# Patient Record
(demographics unavailable — no encounter records)

---

## 2025-03-07 NOTE — CARDIOLOGY SUMMARY
[de-identified] : 3/7/2025: Normal sinus rhythm with a rate of 60 bpm, right axis, a normal OK interval 156 ms, a widened QRS duration 138 ms, consistent with RBBB, a normal QT interval 434 ms, with diffuse nonspecific ST or T wave changes associated with RBBB.   [de-identified] : Rest stress Cardiac PET scan on 8/5/2024. No ischemic ECG changes were seen. PET imaging showed evidence of a prior small nontransmural MI involving the mid inferior, basal inferior and and inferoapical segments. This is consistent with previous studies. [de-identified] : 7/29/2024: Normal LV size, upper normal wall thickness, and normal systolic function with an EF of 60%. The RV is normal in size with normal systolic function. Severe AS with an JAIRO of 0.88 cm, peak/mean pressure gradient of 79/56 mmHg and mild AI, no MS, mild MR, and trace TR was seen.  [de-identified] : Carotid ultrasound on 8/7/2024 showed bilateral intimal thickening with mild calcified plaque at the bulbs.

## 2025-03-07 NOTE — END OF VISIT
Lab and Dr Usha Dominguez at bedside.      Kaleb Lui RN  07/03/20 2018 [FreeTextEntry4] :   I, Wale Wharton, am scribing for and in the presence of Dr. Branden Villalba in the following sections HISTORY OF PRESENT ILLNESSS, PAST MEDICAL/FAMILY/SOCIAL HISTORY; REVIEW OF SYSTEMS; VITAL SIGNS; PHYSICAL EXAM; ASSESSMENT/PLAN [FreeTextEntry3] : I, Branden Villalba, personally performed the services described in this documentation. All medical record entries made by the scribe were at my direction and in my presence. I have reviewed the chart and agree that the record reflects my personal performance and is accurate and complete.            I was present for the above evaluation and agree with the history, physical examination, assessment and plan as above.

## 2025-03-07 NOTE — SOCIAL HISTORY
[TextEntry] :  The patient is currently  and living with his wife.  He and his wife have 3 sons, all of whom are well.  The patient does not smoke, nor has he in the past.  He denies any alcohol or drug use.  He is a semiretired  for the [employer].

## 2025-03-07 NOTE — ACTIVE PROBLEMS
[TextEntry] : 1.  Known history of coronary artery disease. 2.  Status post coronary artery bypass grafting x4 on 11/10/15 3.  Right sided neck pain 4.  Hypertension with adequate control. 5.  Diabetes with intermittent control 6.  Recent fall with T7-T8 fracture 7.  Right popliteal DVT 8.  Abnormal stress test on  8/5/2024 9.  S/P cardiac cath showing severe three vessel disease, with a patent LIMA to the LAD, 90% ostium of the SVG to the Ramus, 20% ostial stenosis of the SVG to the OM1, and occlusion of the SVG to the RCA with sequential 75% mRCA and 90% dRCA stenosis on 2/11/2022. 10.  S/P PTCI, Rotoblade and CYNDY x2 to the RCA on 3/7/2022. 11.  Weight loss on Mounjaro.  12.  Severe AS, JAIRO 0.88 cm.

## 2025-03-07 NOTE — HISTORY OF PRESENT ILLNESS
[FreeTextEntry1] : As you know Mr. Aponte is a 78-year-old man with a past medical history of hypertension, dyslipidemia, diabetes mellitus, status post coronary artery bypass grafting x4 with a LIMA to the LAD, a saphenous vein graft to the ramus intermedius coronary artery, a saphenous vein graft to the OM1, and a saphenous vein graft to the RPDA on 11/10/15, obesity, S/P PTCI, Rotoblade and CYNDY x2 to the RCA on 3/7/2022, who presents today for follow up. The patient was seen by Dr. Coon as he was experiencing worsening shortness of breath. Dr. Coon had advised left and right heart cardiac catheterization to evaluate for revascularization and pre TAVR work up. He wanted to discuss this today. The patient is feeling at his baseline. He denies any cardiac complaints at this time. He specifically denies any chest pain, shortness of breath, syncope, orthopnea, or PND.  He has been taking his medications as prescribed. He is fully vaccinated for Covid-19.

## 2025-03-07 NOTE — SURGICAL HISTORY
[TextEntry] : 1.	ARTERIAL BYPASS SURGERY	 	  2.	BACK SURGERY	 	  3.	BARIATRIC SURGERY	 	2007  	lap band 4.	BRONCHOSCOPY	 	  5	CARDIAC CATHETERIZATION	 	02/11/2022 6.	CARPAL TUNNEL RELEASE	Left	  7.	CATARACT EXTRACTION	Bilateral	march 2015, may 2015  8.	COLONOSCOPY	 	2018 9.	CORONARY ARTERY BYPASS GRAFT	 	11/10/2015  	CABG X 4, Right Endosocpic Saphenous Veins [place] 10.	DENTAL SURGERY	 	   	implants 11.	ELBOW SURGERY	 	  12.	EXCISION BENIGN SKIN LESION TRUNK / ARM / LEG	 	   	L arm melanoma wide excision 13.	EYE SURGERY	Bilateral	2015  	cataract surgery with lens implantation 14.	IVC FILTER PLACEMENT BY RADIOLOGIST	 	01/2022 15.	KNEE SURGERY	 	  16.	NECK SURGERY	 	1975  	calcium deposit was removed 17.	PROSTATE BIOPSY	 	  18.	TONSILLECTOMY	 	   	as a child 19.	torn meniscus	Left	2000  	knee 20.	TRIGGER FINGER RELEASE	 	  21.	ulnar nerve release	Left	2017 22. 	ULNAR NERVE TRANSPOSITION	Left

## 2025-03-07 NOTE — PHYSICAL EXAM
[Well Developed] : well developed [Well Nourished] : well nourished [No Acute Distress] : no acute distress [Normal Conjunctiva] : normal conjunctiva [Normal Venous Pressure] : normal venous pressure [No Carotid Bruit] : no carotid bruit [Normal S1, S2] : normal S1, S2 [No Rub] : no rub [No Gallop] : no gallop [Clear Lung Fields] : clear lung fields [Good Air Entry] : good air entry [No Respiratory Distress] : no respiratory distress  [Soft] : abdomen soft [Non Tender] : non-tender [No Masses/organomegaly] : no masses/organomegaly [Normal Bowel Sounds] : normal bowel sounds [Normal Gait] : normal gait [No Edema] : no edema [No Cyanosis] : no cyanosis [No Clubbing] : no clubbing [No Varicosities] : no varicosities [No Rash] : no rash [No Skin Lesions] : no skin lesions [Moves all extremities] : moves all extremities [No Focal Deficits] : no focal deficits [Normal Speech] : normal speech [Alert and Oriented] : alert and oriented [Normal memory] : normal memory [de-identified] : III/VI KATIE

## 2025-03-07 NOTE — PAST MEDICAL HISTORY
[TextEntry] : 1.	Anemia	   	status post Hematology eval possibly due to dapsone treatment  / hx anemia followed by Heme 2.	Arthritis	   	psoriatic arthritis 3.	Blepharitis	  4.	BPH (benign prostatic hyperplasia)	   	with elevated PSA followed by  5.	CAD (coronary artery disease)	   	s/p CABG x4 in 2015 , RLE harvest wound infxn , Stress test 12/2019 - negative for ischemia  / s/p cardiac intervention at [facility] 3/4/22-   /  Myoview 6/3/22-small transmural MI no ischemia-to f/u regularly 6.	Cataracts, bilateral	  7.	Chronic deep vein thrombosis (DVT) of distal vein of right lower extremity (HCC)	  8.	Chronic rhinitis	  9.	Deep vein thrombosis (DVT) of lower extremity (HCC)	  10.	Dementia (HCC)	   	Mild OMS s/p Neuro eval and f/u MRI with advanced WM dx 11.	Deviated septum	  12.	Diabetes mellitus (HCC)	   	with neuropathy 13.	DVT (deep venous thrombosis) (HCC)	   	Hx post-op DVT with IVCF 1/22- 14.	Encounter for diagnostic colonoscopy due to change in bowel habits	07/18/2018 15.	Fracture	   	Fracture of body of vertebra 16.	High cholesterol	  17.	Hx of CABG	  18.	Hypertension	  19.	Hypothyroidism due to Hashimoto's thyroiditis	  20.	Low vitamin B12 level	  21.	Lower urinary tract symptoms due to benign prostatic hyperplasia	  22.	Mixed hyperlipidemia	  23.	Morbid obesity (HCC)	  24.	Neuropathy	  25.	Polyp of nasal cavity	  26.	Presence of IVC filter	  27.	Psoriasis	  28.	Psoriatic arthritis (HCC)	  29.	Rheumatoid arthritis (HCC)	  30.	Skin cancer	2011  	melanoma on arm and is removed 31.	Sleep apnea	  32.	Status post spinal arthrodesis	   	thoracic / s/p fusion [id number] Dr Delarosa-cholelithiasis seen on f/u CT 3/36-bevpvpdtvgdq-m/u q 6 mos 33.	Thyroid disease	   	hypothyroidism 34.	Vasomotor rhinitis

## 2025-03-07 NOTE — ASSESSMENT
[FreeTextEntry1] : 1.  Known history of coronary artery disease. 2.  Status post coronary artery bypass grafting x4 on 11/10/15 3.  Right sided neck pain 4.  Hypertension with adequate control. 5.  Diabetes with intermittent control 6.  Recent fall with T7-T8 fracture 7.  Right popliteal DVT 8.  Abnormal stress test on  8/5/2024 9.  S/P cardiac cath showing severe three vessel disease, with a patent LIMA to the LAD, 90% ostium of the SVG to the Ramus, 20% ostial stenosis of the SVG to the OM1, and occlusion of the SVG to the RCA with sequential 75% mRCA and 90% dRCA stenosis on 2/11/2022. 10.  S/P PTCI, Rotoblade and CYNDY x2 to the RCA on 3/7/2022. 11.  Weight loss on Mounjaro.  12.  Severe AS, JAIRO 0.88 cm.

## 2025-03-07 NOTE — DISCUSSION/SUMMARY
[EKG obtained to assist in diagnosis and management of assessed problem(s)] : EKG obtained to assist in diagnosis and management of assessed problem(s) [FreeTextEntry1] : The patient's heart rate and blood pressure are well controlled. I have asked him to continue with his current medications as prescribed without change.  The patient has severe aortic stenosis and has been referred for TAVR. He has a history of a prior open heart procedure and is considered a higher risk for a repeat open heart surgery compared to TAVR. Dr. Mohan will evaluate the patient and make the final decision regarding the treatment approach  I have asked the patient to follow a low salt, low fat, low cholesterol diet. I have asked the patient to engage in a minimum of 30 minutes of exercise daily.   I have asked that the patient follow up with me in 6 months' time, or sooner with any change in symptoms

## 2025-03-19 NOTE — HISTORY OF PRESENT ILLNESS
[FreeTextEntry1] : Mr. RAOUL SUNSHINE is a 79 year old male referred by Dr. Villalba for Aortic Stenosis.    He has a PMH pertinent for HTN, DM (Metformin, Mounjaro), CAD (CABG x 4- 2016 [facility]- LIMA to LAD, SVG to Ramus, SVG to OM1 and SVG to RPDA, CYNDY x 2 to RCA 3/22), HLD, Back surgery and Severe Aortic Stenosis.     He presents today for TAVR candidacy.  He states overall he feels "I really walk slow becuase of my back, that is sometimes a balance problem". He has bilateral lower extremity edema. Mr. RAOUL SUNSHINE  denies any chest pain, shortness of breath, fatigue, palpitations, lightheaded/dizziness, syncope, orthopnea, or cough.     NYHA Class  II.  He lives at home with his wife who has a 24 hour live in aid, she is on HD and can't walk so he wishes she were better. He ambulates independently, although admits that he should possibly be using a cane but he doesn't, he is limited in his mobility by his back.

## 2025-03-19 NOTE — PHYSICAL EXAM
[General Appearance - Alert] : alert [General Appearance - In No Acute Distress] : in no acute distress [General Appearance - Well Nourished] : well nourished [General Appearance - Well Developed] : well developed [Sclera] : the sclera and conjunctiva were normal [Outer Ear] : the ears and nose were normal in appearance [Neck Appearance] : the appearance of the neck was normal [Exaggerated Use Of Accessory Muscles For Inspiration] : no accessory muscle use [Respiration, Rhythm And Depth] : normal respiratory rhythm and effort [Auscultation Breath Sounds / Voice Sounds] : lungs were clear to auscultation bilaterally [IV] : a grade 4 [___ +] : bilateral [unfilled]U+ pitting edema to the ankles [Skin Color & Pigmentation] : normal skin color and pigmentation [Skin Turgor] : normal skin turgor [Skin Lesions] : no skin lesions [] : no rash [No Focal Deficits] : no focal deficits [Oriented To Time, Place, And Person] : oriented to person, place, and time [FreeTextEntry1] : intermittent rash in his groin

## 2025-03-19 NOTE — ASSESSMENT
[FreeTextEntry1] : I have independently reviewed the medical records and imaging at the time of this office consultation and discussed the following interpretations with Mr. SUNSHINE:   After review of the TTE Mr. SUNSHINE is noted to have severe aortic stenosis. The risks and benefits of both Surgical Aortic Valve Replacement versus Transcatheter Aortic Valve Replacement (TAVR) have been explained and he would like to proceed with further workup and consideration for TAVR by the Structural Heart Team.   Risks, benefits and alternatives to TAVR were discussed with the patient in detail. Risks discussed included, but not limited to, infection, bleeding, myocardial infarction, cerebrovascular accident, renal failure, vascular injury requiring intervention, cardiac rupture and death. In addition, a roughly 5-10% risk of significant heart block requiring permanent pacemaker implantation was highlighted.   During this visit a shared decision-making process was utilized and included Mr. SUNSHINE, his family, and the available options.  Surgical options, transcatheter options and alternatives to surgery were discussed. Mr. SUNSHINE's values and preferences were considered. He fully understands and wishes to proceed. All questions were answered to his understanding and satisfaction.   Prior to TAVR, Mr. SUNSHINE will require a dedicated TAVR CT scan. The purpose of this CT scan is to evaluate the size of the aortic valve and annulus, and measure peripheral vessel diameters to determine feasibility for transcatheter access for the TAVR, and measure of alternate access options if transfemoral is not feasible.  Once the TAVR CT scan is complete the scan will be reviewed by a multidisciplinary team of interventional cardiologist, cardiac surgeons and PAs and NPs, to plan the best approach for the surgical procedure.   PREOPERATIVE CHECKLIST: (Discussed with patient)  - Confirm allergies, including latex: PCN - Confirm pacemaker: None  - Anticoagulation/antiplatelets noted and will be discontinued/continued: Aspirin & Brillinta- maintain - SGLT-2 Inhibitors (discontinued 3 days prior to surgery) or GLP-1 (discontinued 1 week prior to surgery): Mounjaro- 1 week  - All other supplements, NSAIDs and fish oil were discussed and will be held one week before surgery   PLAN: - TAVR CTA - Cardiac Catheterization, to assess the Coronary arteries - Transthoracic Echocardiogram at [facility] to assess the structure and function of the heart valves  - Plan for TAVR tentatively on 5/16/25 - Obtain Op note & Prior Cath notes from [facility]   , Dr. Mohan, personally performed the evaluation and management (E/M) services for this new patient.  That E/M includes conducting the initial examination, assessing all conditions, and establishing the plan of care.  Today, Ella Aaron, was here to observe my evaluation and management services for this patient to be followed going forward.

## 2025-03-19 NOTE — PHYSICAL EXAM
[General Appearance - Alert] : alert [General Appearance - In No Acute Distress] : in no acute distress [General Appearance - Well Nourished] : well nourished [General Appearance - Well Developed] : well developed [Sclera] : the sclera and conjunctiva were normal [Outer Ear] : the ears and nose were normal in appearance [Neck Appearance] : the appearance of the neck was normal [Exaggerated Use Of Accessory Muscles For Inspiration] : no accessory muscle use [Respiration, Rhythm And Depth] : normal respiratory rhythm and effort [Auscultation Breath Sounds / Voice Sounds] : lungs were clear to auscultation bilaterally [IV] : a grade 4 [___ +] : bilateral [unfilled]U+ pitting edema to the ankles [Skin Color & Pigmentation] : normal skin color and pigmentation [Skin Turgor] : normal skin turgor [] : no rash [Skin Lesions] : no skin lesions [No Focal Deficits] : no focal deficits [Oriented To Time, Place, And Person] : oriented to person, place, and time [FreeTextEntry1] : intermittent rash in his groin

## 2025-03-19 NOTE — ASSESSMENT
[FreeTextEntry1] : I have independently reviewed the medical records and imaging at the time of this office consultation and discussed the following interpretations with Mr. SUNSHINE:   After review of the TTE Mr. SUNSHINE is noted to have severe aortic stenosis. The risks and benefits of both Surgical Aortic Valve Replacement versus Transcatheter Aortic Valve Replacement (TAVR) have been explained and he would like to proceed with further workup and consideration for TAVR by the Structural Heart Team.   Risks, benefits and alternatives to TAVR were discussed with the patient in detail. Risks discussed included, but not limited to, infection, bleeding, myocardial infarction, cerebrovascular accident, renal failure, vascular injury requiring intervention, cardiac rupture and death. In addition, a roughly 5-10% risk of significant heart block requiring permanent pacemaker implantation was highlighted.   During this visit a shared decision-making process was utilized and included Mr. SUNSHINE, his family, and the available options.  Surgical options, transcatheter options and alternatives to surgery were discussed. Mr. SUNSHINE's values and preferences were considered. He fully understands and wishes to proceed. All questions were answered to his understanding and satisfaction.   Prior to TAVR, Mr. SUNSHINE will require a dedicated TAVR CT scan. The purpose of this CT scan is to evaluate the size of the aortic valve and annulus, and measure peripheral vessel diameters to determine feasibility for transcatheter access for the TAVR, and measure of alternate access options if transfemoral is not feasible.  Once the TAVR CT scan is complete the scan will be reviewed by a multidisciplinary team of interventional cardiologist, cardiac surgeons and PAs and NPs, to plan the best approach for the surgical procedure.   PREOPERATIVE CHECKLIST: (Discussed with patient)  - Confirm allergies, including latex: PCN - Confirm pacemaker: None  - Anticoagulation/antiplatelets noted and will be discontinued/continued: Aspirin & Brillinta- maintain - SGLT-2 Inhibitors (discontinued 3 days prior to surgery) or GLP-1 (discontinued 1 week prior to surgery): Mounjaro- 1 week  - All other supplements, NSAIDs and fish oil were discussed and will be held one week before surgery   PLAN: - TAVR CTA - Cardiac Catheterization, to assess the Coronary arteries - Tr[facility]sthoracic [facility]chocardiogram at [facility] to assess the structure and function of the heart valves  - Plan for TAVR tentatively on 5/16/25 - Obtain Op note & Prior Cath notes from [facility]   , Dr. Mohan, personally performed the evaluation and management (E/M) services for this new patient.  That E/M includes conducting the initial examination, assessing all conditions, and establishing the plan of care.  Today, Ella Aaron, was here to observe my evaluation and management services for this patient to be followed going forward.

## 2025-07-22 NOTE — REASON FOR VISIT
[Follow-Up: _____] : a [unfilled] follow-up visit [FreeTextEntry1] : FOLLOW YOUR HEART- Transitional Care- [facility]

## 2025-07-22 NOTE — HISTORY OF PRESENT ILLNESS
[FreeTextEntry1] : 79M Gastric Band (2007), DM (A1C 6.5), Peripheral Neuropathy, Cholelithiasis, Chronic Anemia (follows with Heme), Hashimoto's Thyroiditis, OA, Fall with T6-T8 Fracture s/p T6-T9 Fusion, Left Ulnar Nerve Release (2020), Left meniscal Repair (2000), Left Arm Melanoma s/p Wide Excision, b/l Cataract Surgery. 7/18 admitted through [facility], beginning of case LVEDP 18 mmHg, post deployment trace PVL (mG 7 mmHg) with brief complete heart block req semi-permanent RV pacing lead placement, extubated in the OR & transferred to CTICU. Monitored in CTICU for rhythm disturbances. EP following. No pacing requirements. There were episodes of SVT rates up to 110bpm, no further episodes. EKG/tele showing baseline RBBB, Sinus rhythm, stable conduction system. Semi perm pacing lead removed without incident.  Pt remained hemodynamically stable and discharged home with support of live in aide, home care services and the [facility] team. Initial visit completed in home CC "I'm doing ok"

## 2025-07-22 NOTE — PHYSICAL EXAM
[Sclera] : the sclera and conjunctiva were normal [Neck Appearance] : the appearance of the neck was normal [Respiration, Rhythm And Depth] : normal respiratory rhythm and effort [Exaggerated Use Of Accessory Muscles For Inspiration] : no accessory muscle use [Auscultation Breath Sounds / Voice Sounds] : lungs were clear to auscultation bilaterally [Apical Impulse] : the apical impulse was normal [Heart Rate And Rhythm] : heart rate was normal and rhythm regular [Heart Sounds] : normal S1 and S2 [Murmurs] : no murmurs [Chest Visual Inspection Thoracic Asymmetry] : no chest asymmetry [1+] : left 1+ [Bowel Sounds] : normal bowel sounds [Abdomen Soft] : soft [Abnormal Walk] : normal gait [Skin Color & Pigmentation] : normal skin color and pigmentation [Skin Turgor] : normal skin turgor [] : no rash [Oriented To Time, Place, And Person] : oriented to person, place, and time [Impaired Insight] : insight and judgment were intact [Affect] : the affect was normal [Examination Of The Chest] : the chest was normal in appearance [FreeTextEntry2] : B/L LE with trace pedal edema, B/L calves soft, NT  [FreeTextEntry1] : at baseline

## 2025-07-23 NOTE — PROCEDURE
[FreeTextEntry1] : Post-op Transthoracic Echocardiogram 7/18/25  1. Left ventricular cavity is normal in size. Left ventricular systolic function is hyperdynamic with an ejection fraction of 77 % by Munoz's method of disks.  2. The left ventricular diastolic function is indeterminate.  3. Normal right ventricular cavity size and borderline reduced right ventricular systolic function.  4. Left atrium is normal in size.  5. Mild mitral regurgitation.  6. No pericardial effusion seen.  7. A NAOMIE 3 Ultra RESILIA (TAVR) valve replacement is present in the aortic position The prosthetic valve is well seated with normal function. No intravalvular regurgitation No paravalvular regurgitation.  8. The peak transaortic velocity is 1.06 m/s, peak transaortic gradient is 4.5 mmHg and mean transaortic gradient is 2.0 mmHg with an LVOT/aortic valve VTI ratio of 0.97.  9. Mild left ventricular hypertrophy. 10. Mild to moderate tricuspid regurgitation. 11. Mitral valve leaflets have focal calcification. 12. There is mild calcification of the mitral valve annulus.

## 2025-07-23 NOTE — REASON FOR VISIT
[de-identified] :  Percutaneous Transfemoral TAVR via Left Common Femoral Artery (26mm Maribeth Resilia) [de-identified] : 7/18/25 [de-identified] : Postop course significant for brief heart block, EP consulted and determined no pacer required, semipermanent pacing lead removed and he was discharged 7/20/25 with an MCOT.

## 2025-07-23 NOTE — ASSESSMENT
[FreeTextEntry1] : Mr. SUNSHINE presents today for his postoperative follow up appointment; he is status post Transcatheter Aortic Valve Replacement.   Physical Exam was as noted above. MCOT review was unremarkable.   During the visit, we discussed the importance of increasing activity and exercise as tolerated. We discussed the need for antibiotic prophylaxis with procedures such as dental work and colonoscopy or endoscopy. We recommend waiting a full six months before undergoing any dental procedure or cleaning. He should maintain the MCOT device as instructed for a total of 30 days.   Overall, I am pleased with Mr. SUNSHINE's progress postoperatively. I am recommending that he continue follow up care with Cardiology and Primary Care Provider; he will return to care in office as needed. All questions answered, he verbalizes understanding.   PLAN: - Follow up Cardiology - CBC/BMP 30 Day Follow Up - Transthoracic Echocardiogram 30 Day Follow Up - EKG 30 Day Follow Up - Maintain MCOT

## 2025-07-25 NOTE — REASON FOR VISIT
[de-identified] :  Percutaneous Transfemoral TAVR via Left Common Femoral Artery (26mm Maribeth Resilia) [de-identified] : 7/18/25 [de-identified] : Postop course significant for brief heart block, EP consulted and determined no pacer required, semipermanent pacing lead removed and he was discharged 7/20/25 with an MCOT.

## 2025-07-25 NOTE — REASON FOR VISIT
[de-identified] :  Percutaneous Transfemoral TAVR via Left Common Femoral Artery (26mm Maribeth Resilia) [de-identified] : 7/18/25 [de-identified] : Postop course significant for brief heart block, EP consulted and determined no pacer required, semipermanent pacing lead removed and he was discharged 7/20/25 with an MCOT.

## 2025-07-25 NOTE — PROCEDURE
[FreeTextEntry1] : Post-op Transthoracic Echocardiogram 7/18/25  1. Left ventricular cavity is normal in size. Left ventricular systolic function is hyperdynamic with an ejection fraction of 77 % by Munoz's method of disks.  2. The left ventricular diastolic function is indeterminate.  3. Normal right ventricular cavity size and borderline reduced right ventricular systolic function.  4. Left atrium is normal in size.  5. Mild mitral regurgitation.  6. No pericardial effusion seen.  7. A NAOMIE 3 Ultra RESILIA (TAVR) valve replacement is present in the aortic position The prosthetic valve is well seated with normal function. No intravalvular regurgitation No paravalvular regurgitation.  8. The peak transaortic velocity is 1.06 m/s, peak transaortic gradient is 4.5 mmHg and mean transaortic gradient is 2.0 mmHg with an LVOT/aortic valve VTI ratio of 0.97.  9. Mild left ventricular hypertrophy. 10. Mild to moderate tricuspid regurgitation. 11. Mitral valve leaflets have focal calcification. 12. There is mild calcification of the mitral valve annulus. 
[FreeTextEntry1] : Post-op Transthoracic Echocardiogram 7/18/25  1. Left ventricular cavity is normal in size. Left ventricular systolic function is hyperdynamic with an ejection fraction of 77 % by Munoz's method of disks.  2. The left ventricular diastolic function is indeterminate.  3. Normal right ventricular cavity size and borderline reduced right ventricular systolic function.  4. Left atrium is normal in size.  5. Mild mitral regurgitation.  6. No pericardial effusion seen.  7. A NAOMIE 3 Ultra RESILIA (TAVR) valve replacement is present in the aortic position The prosthetic valve is well seated with normal function. No intravalvular regurgitation No paravalvular regurgitation.  8. The peak transaortic velocity is 1.06 m/s, peak transaortic gradient is 4.5 mmHg and mean transaortic gradient is 2.0 mmHg with an LVOT/aortic valve VTI ratio of 0.97.  9. Mild left ventricular hypertrophy. 10. Mild to moderate tricuspid regurgitation. 11. Mitral valve leaflets have focal calcification. 12. There is mild calcification of the mitral valve annulus. 
[employer] car

## 2025-07-25 NOTE — PHYSICAL EXAM
[Respiration, Rhythm And Depth] : normal respiratory rhythm and effort [Auscultation Breath Sounds / Voice Sounds] : lungs were clear to auscultation bilaterally [Heart Rate And Rhythm] : heart rate was normal and rhythm regular [Clean] : clean [Dry] : dry [Healing Well] : healing well [FreeTextEntry3] :  All Review of Systems Negative.